# Patient Record
(demographics unavailable — no encounter records)

---

## 2025-06-02 NOTE — REASON FOR VISIT
[CV Risk Factors and Non-Cardiac Disease] : CV risk factors and non-cardiac disease [Parent] : parent

## 2025-06-02 NOTE — ASSESSMENT
[FreeTextEntry1] : bicuspid AV asymptomatic. no significant stenosis of regurgitation, last echo repeat echo next year - will schedule

## 2025-06-02 NOTE — DISCUSSION/SUMMARY
[Parent] : the parent [FreeTextEntry1] : Will repeat echo - schedule. If no significant change, f/u in 12 months or if any symptoms.

## 2025-06-02 NOTE — PHYSICAL EXAM
[General Appearance - In No Acute Distress] : no acute distress [Normal Conjunctiva] : the conjunctiva exhibited no abnormalities [] : no respiratory distress [Respiration, Rhythm And Depth] : normal respiratory rhythm and effort [Auscultation Breath Sounds / Voice Sounds] : lungs were clear to auscultation bilaterally [Heart Rate And Rhythm] : heart rate and rhythm were normal [Heart Sounds] : normal S1 and S2 [Murmurs] : no murmurs present [Edema] : no peripheral edema present [Bowel Sounds] : normal bowel sounds [Abdomen Soft] : soft [Nail Clubbing] : no clubbing of the fingernails [Cyanosis, Localized] : no localized cyanosis [Skin Color & Pigmentation] : normal skin color and pigmentation [FreeTextEntry1] : no JVD, no bruits

## 2025-06-02 NOTE — HISTORY OF PRESENT ILLNESS
[FreeTextEntry1] : Pt is 34 year old with PMH of mental disability, anxiety,  congenital bicuspid aortic valve, no stenosis of regurgitation, mild MR, presenting for follow-up.  Pt reports no chest pain, no SOB, no syncopal episodes.  TTE 02/22/22 EF 64% Mild MR, Mild TR Mildly thickened AV  likely tricuspid with partial fusion of L and R coronary cusps - physiologically bicuspid. Clinically stable.  6/03/24 TTE EF 55%-60% Mild G1DD Mild to Mod MR  AV appears trileaflet. There is mild thickening of the aortic valve leaflets.  No AS, no AR regurgitation.